# Patient Record
Sex: MALE | Race: WHITE | NOT HISPANIC OR LATINO | Employment: FULL TIME | URBAN - METROPOLITAN AREA
[De-identification: names, ages, dates, MRNs, and addresses within clinical notes are randomized per-mention and may not be internally consistent; named-entity substitution may affect disease eponyms.]

---

## 2021-05-26 ENCOUNTER — VIRTUAL VISIT (OUTPATIENT)
Dept: FAMILY MEDICINE | Facility: CLINIC | Age: 41
End: 2021-05-26
Payer: COMMERCIAL

## 2021-05-26 DIAGNOSIS — K21.9 GASTROESOPHAGEAL REFLUX DISEASE, UNSPECIFIED WHETHER ESOPHAGITIS PRESENT: Primary | ICD-10-CM

## 2021-05-26 PROCEDURE — 99203 OFFICE O/P NEW LOW 30 MIN: CPT | Mod: 95 | Performed by: FAMILY MEDICINE

## 2021-05-26 RX ORDER — PANTOPRAZOLE SODIUM 40 MG/1
40 TABLET, DELAYED RELEASE ORAL DAILY
Qty: 90 TABLET | Refills: 3 | Status: SHIPPED | OUTPATIENT
Start: 2021-05-26 | End: 2022-05-16

## 2021-05-26 NOTE — PROGRESS NOTES
Reyes is a 41 year old who is being evaluated via a billable telephone visit.      What phone number would you like to be contacted at? mobile  How would you like to obtain your AVS? Bennett Luz   Reyes is a 41 year old who presents for the following health issues:    HPI     Patient has h/o GERD for over 10 years. Patient takes omeprazole on and off which helps. Patient has epigastric discomfort, hoarseness, acid taste in mouth especially when laying down. Patient does drink a lot of tea but trying to limit this.    Review of Systems   Constitutional, HEENT, cardiovascular, pulmonary, GI, , musculoskeletal, neuro, skin, endocrine and psych systems are negative, except as otherwise noted.      Objective           Vitals:  No vitals were obtained today due to virtual visit.    Physical Exam   healthy, alert and no distress  PSYCH: Alert and oriented times 3; coherent speech, normal   rate and volume, able to articulate logical thoughts, able   to abstract reason, no tangential thoughts, no hallucinations   or delusions  His affect is normal  RESP: No cough, no audible wheezing, able to talk in full sentences  Remainder of exam unable to be completed due to telephone visits    A/P:  (K21.9) Gastroesophageal reflux disease, unspecified whether esophagitis present  (primary encounter diagnosis)  Comment:   Plan: GASTROENTEROLOGY ADULT REF CONSULT ONLY,         Helicobacter pylori Antigen Stool, pantoprazole        (PROTONIX) 40 MG EC tablet        Treat with ppi daily. R/o h pylori. If no improvements, patient will see GI for consultation.    Albaro Nash MD          Phone call duration: 11 minutes

## 2021-05-27 DIAGNOSIS — K21.9 GASTROESOPHAGEAL REFLUX DISEASE, UNSPECIFIED WHETHER ESOPHAGITIS PRESENT: ICD-10-CM

## 2021-05-27 PROCEDURE — 87338 HPYLORI STOOL AG IA: CPT | Performed by: FAMILY MEDICINE

## 2021-05-28 LAB — H PYLORI AG STL QL IA: NEGATIVE

## 2021-06-19 ENCOUNTER — HEALTH MAINTENANCE LETTER (OUTPATIENT)
Age: 41
End: 2021-06-19

## 2021-08-05 ENCOUNTER — VIRTUAL VISIT (OUTPATIENT)
Dept: GASTROENTEROLOGY | Facility: CLINIC | Age: 41
End: 2021-08-05
Attending: FAMILY MEDICINE
Payer: COMMERCIAL

## 2021-08-05 DIAGNOSIS — K21.9 GASTROESOPHAGEAL REFLUX DISEASE, UNSPECIFIED WHETHER ESOPHAGITIS PRESENT: ICD-10-CM

## 2021-08-05 PROCEDURE — 99203 OFFICE O/P NEW LOW 30 MIN: CPT | Mod: 95 | Performed by: PHYSICIAN ASSISTANT

## 2021-08-05 RX ORDER — FAMOTIDINE 20 MG/1
20 TABLET, FILM COATED ORAL AT BEDTIME
Qty: 30 TABLET | Refills: 2 | Status: SHIPPED | OUTPATIENT
Start: 2021-08-05 | End: 2022-02-24

## 2021-08-05 NOTE — PROGRESS NOTES
Reyes is a 41 year old who is being evaluated via a billable video visit.      How would you like to obtain your AVS? MyChart  If the video visit is dropped, the invitation should be resent by: Send to e-mail at: [email or web address]  Will anyone else be joining your video visit? No    Video Start Time:   Video-Visit Details    Type of service:  Video Visit    Video End Time:    Originating Location (pt. Location):     Distant Location (provider location):  [facility]     Platform used for Video Visit:     Joey Garcia CMA

## 2021-08-05 NOTE — PATIENT INSTRUCTIONS
It was a pleasure visiting with you today.     Continue protonix every morning. This is best taken 30 minutes before breakfast.     Start taking pepcid (Famotidine) 20mg at bedtime.     Please schedule your upper endoscopy. If you have not heard from the scheduling office within 2 business days, please call [phone] to schedule.     Let's plan to follow up 2-3 weeks after your work up.     Mundo Chavez PA-C  Gastroenterology  [facility]

## 2021-08-05 NOTE — PROGRESS NOTES
GASTROENTEROLOGY NEW PATIENT VIDEO VISIT     Video Start Time: 9:01 AM    CC/REFERRING MD:    No Ref-Primary, Physician  Albaro Nash    REASON FOR CONSULTATION:   Referred by Albaro Nash for New Patient (Gastroesophageal reflux disease)        HISTORY OF PRESENT ILLNESS:    Reyes Angel is 41 year old male who presents for evaluation of reflux. He has had intermittent reflux symptoms for the past 10-15 years. He recalls being diagnosed with possible ulcer in his early 20's and being on PPI medication for 3-6 months course which did resolve his symptoms at the time. Since then he has taken tums and prilosec occ for symptoms. He would occ feel gassy with prilosec. More recently he saw his primary care for his reflux and was given protonix 40mg. He does not feel any side effects with protonix and finds that it is working better than otc prilosec, however he still has breakthrough symptoms often. He is avoiding alcohol and carbonated beverages. He is still occ taking tums. He is avoiding eating late before bedtime and sleeping on his left side.     He still has symptoms in the morning of acid reflux and also describes sore throat.     He has never had an upper endoscopy before.     ALLERGIES:  Patient has no known allergies.      PERTINENT MEDICATIONS:    Current Outpatient Medications:      pantoprazole (PROTONIX) 40 MG EC tablet, Take 1 tablet (40 mg) by mouth daily Take 30 minutes before eating., Disp: 90 tablet, Rfl: 3      PHYSICAL EXAMINATION:  Constitutional: aaox3, cooperative, pleasant, not dyspneic/diaphoretic, no acute distress      GENERAL: Healthy, alert and no distress  EYES: Eyes grossly normal to inspection.  No discharge or erythema, or obvious scleral/conjunctival abnormalities.  RESP: No audible wheeze, cough, or visible cyanosis.  No visible retractions or increased work of breathing.    SKIN: Visible skin clear. No significant rash, abnormal pigmentation or lesions.  NEURO: Cranial nerves  grossly intact.  Mentation and speech appropriate for age.  PSYCH: Mentation appears normal, affect normal/bright, judgement and insight intact, normal speech and appearance well-groomed.        ASSESSMENT/PLAN:    1. Gastroesophageal reflux disease, unspecified whether esophagitis present  - Adult Gastro Ref - Procedure Only; Future  - famotidine (PEPCID) 20 MG tablet; Take 1 tablet (20 mg) by mouth At Bedtime  Dispense: 30 tablet; Refill: 2      Reyes Angel is a 41 year old male who presents for evaluation of GERD. He has hx of GERD for at least 10-15 years. He has treated in the past with [facility] otc Prilosec. More recently he has started protonix 40mg but despite this he continues to have symptoms. Recommended we add in pepcid in the evenings and check an EGD given his continued symptoms and long hx of GERD. He verbalizes understanding and agrees with this plan.     We will plan to follow up 2-3 weeks after his EGD to review results.     Thank you for this consultation.  It was a pleasure to participate in the care of this patient; please contact us with any further questions.      This note was created with voice recognition software, and while reviewed for accuracy, typos may remain.         30 minutes spent on the date of the encounter doing chart review, history and exam, documentation and further activities per the note    Mundo Chavez PA-C  Gastroenterology  [facility]       Video-Visit Details    Type of service:  Video Visit    Video End Time: 9:26 AM    Originating Location (pt. Location): Home    Distant Location (provider location):  [facility]     Platform used for Video Visit: [employer]

## 2021-08-06 DIAGNOSIS — Z11.59 ENCOUNTER FOR SCREENING FOR OTHER VIRAL DISEASES: ICD-10-CM

## 2021-08-20 ENCOUNTER — TELEPHONE (OUTPATIENT)
Dept: GASTROENTEROLOGY | Facility: CLINIC | Age: 41
End: 2021-08-20

## 2021-08-20 NOTE — TELEPHONE ENCOUNTER
Writer reviewed pre-assessment questions with patient prior to upcoming EGD on 8.31.2021.      Covid test scheduled: 8.27.2021    Arrival time: 0700    Facility location: [facility]    Sedation type: CS    Implantable devices? No    Blood thinners/Antiplatelet medication? No    Reviewed EGD prep instructions with patient.      Designated  policy reviewed with patient.     Patient verbalized understanding.  No further questions or concerns.    Inocencia Brink RN

## 2021-08-27 ENCOUNTER — LAB (OUTPATIENT)
Dept: LAB | Facility: CLINIC | Age: 41
End: 2021-08-27
Payer: COMMERCIAL

## 2021-08-27 DIAGNOSIS — Z11.59 ENCOUNTER FOR SCREENING FOR OTHER VIRAL DISEASES: ICD-10-CM

## 2021-08-27 PROCEDURE — U0005 INFEC AGEN DETEC AMPLI PROBE: HCPCS

## 2021-08-27 PROCEDURE — U0003 INFECTIOUS AGENT DETECTION BY NUCLEIC ACID (DNA OR RNA); SEVERE ACUTE RESPIRATORY SYNDROME CORONAVIRUS 2 (SARS-COV-2) (CORONAVIRUS DISEASE [COVID-19]), AMPLIFIED PROBE TECHNIQUE, MAKING USE OF HIGH THROUGHPUT TECHNOLOGIES AS DESCRIBED BY CMS-2020-01-R: HCPCS

## 2021-08-28 LAB — SARS-COV-2 RNA RESP QL NAA+PROBE: NEGATIVE

## 2021-08-31 ENCOUNTER — HOSPITAL ENCOUNTER (OUTPATIENT)
Facility: AMBULATORY SURGERY CENTER | Age: 41
Discharge: HOME | End: 2021-08-31
Attending: INTERNAL MEDICINE | Admitting: INTERNAL MEDICINE
Payer: COMMERCIAL

## 2021-08-31 VITALS
SYSTOLIC BLOOD PRESSURE: 115 MMHG | HEIGHT: 75 IN | OXYGEN SATURATION: 97 % | TEMPERATURE: 97.9 F | WEIGHT: 225 LBS | HEART RATE: 66 BPM | RESPIRATION RATE: 16 BRPM | BODY MASS INDEX: 27.98 KG/M2 | DIASTOLIC BLOOD PRESSURE: 77 MMHG

## 2021-08-31 LAB — UPPER GI ENDOSCOPY: NORMAL

## 2021-08-31 PROCEDURE — 43235 EGD DIAGNOSTIC BRUSH WASH: CPT

## 2021-08-31 RX ORDER — SODIUM CHLORIDE, SODIUM LACTATE, POTASSIUM CHLORIDE, CALCIUM CHLORIDE 600; 310; 30; 20 MG/100ML; MG/100ML; MG/100ML; MG/100ML
INJECTION, SOLUTION INTRAVENOUS CONTINUOUS
Status: DISCONTINUED | OUTPATIENT
Start: 2021-08-31 | End: 2021-09-01 | Stop reason: HOSPADM

## 2021-08-31 RX ORDER — SIMETHICONE
LIQUID (ML) MISCELLANEOUS PRN
Status: DISCONTINUED | OUTPATIENT
Start: 2021-08-31 | End: 2021-08-31 | Stop reason: HOSPADM

## 2021-08-31 RX ORDER — LIDOCAINE 40 MG/G
CREAM TOPICAL
Status: DISCONTINUED | OUTPATIENT
Start: 2021-08-31 | End: 2021-09-01 | Stop reason: HOSPADM

## 2021-08-31 RX ORDER — ONDANSETRON 2 MG/ML
4 INJECTION INTRAMUSCULAR; INTRAVENOUS
Status: DISCONTINUED | OUTPATIENT
Start: 2021-08-31 | End: 2021-09-01 | Stop reason: HOSPADM

## 2021-08-31 RX ORDER — FENTANYL CITRATE 50 UG/ML
INJECTION, SOLUTION INTRAMUSCULAR; INTRAVENOUS PRN
Status: DISCONTINUED | OUTPATIENT
Start: 2021-08-31 | End: 2021-08-31 | Stop reason: HOSPADM

## 2021-08-31 ASSESSMENT — MIFFLIN-ST. JEOR: SCORE: 2021.22

## 2021-10-04 ENCOUNTER — HEALTH MAINTENANCE LETTER (OUTPATIENT)
Age: 41
End: 2021-10-04

## 2021-11-04 ENCOUNTER — VIRTUAL VISIT (OUTPATIENT)
Dept: GASTROENTEROLOGY | Facility: CLINIC | Age: 41
End: 2021-11-04
Payer: COMMERCIAL

## 2021-11-04 DIAGNOSIS — K21.9 GASTROESOPHAGEAL REFLUX DISEASE, UNSPECIFIED WHETHER ESOPHAGITIS PRESENT: Primary | ICD-10-CM

## 2021-11-04 DIAGNOSIS — K44.9 HIATAL HERNIA: ICD-10-CM

## 2021-11-04 PROCEDURE — 99215 OFFICE O/P EST HI 40 MIN: CPT | Mod: 95 | Performed by: PHYSICIAN ASSISTANT

## 2021-11-04 NOTE — PATIENT INSTRUCTIONS
It was a pleasure visiting with you today.     Please make a lab appointment for blood work and to collect stool kits. You can contact your local [facility] or call [phone] to schedule at any convenient [facility] location. We will either call you or send you a My Chart message with your results.     Continue protonix 40mg every morning. Continue pepcid as needed.     Follow up in 3 months. Please call [phone] to schedule your follow up.       Mundo Chavez PA-C  Gastroenterology  [facility]

## 2021-11-04 NOTE — PROGRESS NOTES
GASTROENTEROLOGY FOLLOW UP VIDEO VISIT     Video Start Time: 8:31 AM    CC/REFERRING MD:    No Ref-Primary, Physician      REASON FOR VISIT: RECHECK (Follow up from procedure)      HISTORY OF PRESENT ILLNESS:    Reyes Angel is 41 year old male who presents for follow up.     He was initially evaluated in August 2021 for concerns with reflux.  See initial note for more detail.  Briefly he was experiencing 10 to 15 years of intermittent reflux.  He also recalls being diagnosed with a possible ulcer in his early 20s and was treated with PPI medication for several months which did resolve his symptoms.  Over the past 10 to 15 years he has taken Tums and Prilosec as needed for symptoms.  In the past several months he has been on Protonix 40 mg daily for more persistent reflux symptoms.  He does note improvement on Protonix daily.  He occasionally takes Pepcid 20 mg as needed for breakthrough symptoms.  He overall feels that this regimen works well for him.  She is also monitor his diet and behavior.  He tries to avoid eating late and sleeps with head of bed elevated which helps.  He is still taking Tums on occasion.    We did update an upper endoscopy in August 2021.  He was noted to have a 3 cm hiatal hernia otherwise upper endoscopy was unrevealing.        Reyes  has a past medical history of Gastroesophageal reflux disease.    He  has a past surgical history that includes Esophagoscopy, gastroscopy, duodenoscopy (EGD), combined (N/A, 8/31/2021).    He   reports that he has never smoked. He has never used smokeless tobacco. He reports current alcohol use. He reports that he does not use drugs.      ALLERGIES:  Patient has no known allergies.      PREVIOUS ENDOSCOPY:    UPPER ENDOSCOPY 8/31/2021  Findings:        The examined esophagus was normal.        Esophagogastric landmarks were identified: the Z-line was found at 44        cm, the gastroesophageal junction was found at 47 cm and the site of        hiatal  narrowing was found at 45 cm from the incisors.        The gastroesophageal flap valve was visualized endoscopically and        classified as Hill Grade IV (no fold, wide open lumen, hiatal hernia present).        A 3 cm hiatal hernia was present.        The exam of the stomach was otherwise normal.        The examined duodenum was normal.        The cardia and gastric fundus were normal on retroflexion.     PERTINENT MEDICATIONS:    Current Outpatient Medications:      famotidine (PEPCID) 20 MG tablet, Take 1 tablet (20 mg) by mouth At Bedtime (Patient taking differently: Take 20 mg by mouth nightly as needed ), Disp: 30 tablet, Rfl: 2     pantoprazole (PROTONIX) 40 MG EC tablet, Take 1 tablet (40 mg) by mouth daily Take 30 minutes before eating., Disp: 90 tablet, Rfl: 3      PHYSICAL EXAMINATION:  Constitutional: aaox3, cooperative, pleasant, not dyspneic/diaphoretic, no acute distress      GENERAL: Healthy, alert and no distress  EYES: Eyes grossly normal to inspection.  No discharge or erythema, or obvious scleral/conjunctival abnormalities.  RESP: No audible wheeze, cough, or visible cyanosis.  No visible retractions or increased work of breathing.    SKIN: Visible skin clear. No significant rash, abnormal pigmentation or lesions.  NEURO: Cranial nerves grossly intact.  Mentation and speech appropriate for age.  PSYCH: Mentation appears normal, affect normal/bright, judgement and insight intact, normal speech and appearance well-groomed.        ASSESSMENT/PLAN:    1. Gastroesophageal reflux disease, unspecified whether esophagitis present  - Comprehensive metabolic panel; Future  - CBC with Platelets & Differential; Future  - TSH with free T4 reflex; Future  2. Hiatal hernia          Reyes Angel is a 41 year old male who presents for follow-up GERD.        Overall doing well at this time with his regimen of protonix daily and pepcid as needed. Recent EGD noted hiatal hernia but was otherwise unremarkable.  We reviewed these findings today. We discussed management of both GERD and hiatal hernia.  Reviewed benefits vs risks of PPI medication. Will have him continue protonix daily and pepcid as needed at this time. Eventually can try to decrease dose of PPI to lowest effective dose or switch to pepcid altogether. Also reviewed  option for hiatal hernia repair if wanting to avoid long term PPI/medications.     We will check some basic labs as I do not see any labs in the system. Again, will have him continue with current reflux regimen. Follow up in 3 months to check progress and consider decreasing medications at that time.     All questions and concerns addressed.       Return in about 3 months (around 2/4/2022) for Follow up, using a video visit.      Thank you for this consultation.  It was a pleasure to participate in the care of this patient; please contact us with any further questions.        This note was created with voice recognition software, and while reviewed for accuracy, typos may remain.       40 minutes spent on the date of the encounter doing chart review, history and exam, documentation and further activities per the note    Mundo Chavez PA-C  Gastroenterology  [facility]       Video-Visit Details    Type of service:  Video Visit    Video End Time:8:57 AM    Originating Location (pt. Location): Home    Distant Location (provider location):  [facility]     Platform used for Video Visit: [employer]

## 2021-11-04 NOTE — PROGRESS NOTES
Reyes is a 41 year old who is being evaluated via a billable video visit.      How would you like to obtain your AVS? MyChart  If the video visit is dropped, the invitation should be resent by: Text to cell phone: [phone]  Will anyone else be joining your video visit? No      Dayana Chavira LPN on 11/4/21 at 8:14 AM

## 2021-11-29 ENCOUNTER — LAB (OUTPATIENT)
Dept: LAB | Facility: CLINIC | Age: 41
End: 2021-11-29
Payer: COMMERCIAL

## 2021-11-29 DIAGNOSIS — K21.9 GASTROESOPHAGEAL REFLUX DISEASE, UNSPECIFIED WHETHER ESOPHAGITIS PRESENT: ICD-10-CM

## 2021-11-29 LAB
BASOPHILS # BLD AUTO: 0 10E3/UL (ref 0–0.2)
BASOPHILS NFR BLD AUTO: 1 %
EOSINOPHIL # BLD AUTO: 0.2 10E3/UL (ref 0–0.7)
EOSINOPHIL NFR BLD AUTO: 3 %
ERYTHROCYTE [DISTWIDTH] IN BLOOD BY AUTOMATED COUNT: 12.3 % (ref 10–15)
HCT VFR BLD AUTO: 45 % (ref 40–53)
HGB BLD-MCNC: 15 G/DL (ref 13.3–17.7)
LYMPHOCYTES # BLD AUTO: 1.8 10E3/UL (ref 0.8–5.3)
LYMPHOCYTES NFR BLD AUTO: 31 %
MCH RBC QN AUTO: 29.6 PG (ref 26.5–33)
MCHC RBC AUTO-ENTMCNC: 33.3 G/DL (ref 31.5–36.5)
MCV RBC AUTO: 89 FL (ref 78–100)
MONOCYTES # BLD AUTO: 0.8 10E3/UL (ref 0–1.3)
MONOCYTES NFR BLD AUTO: 14 %
NEUTROPHILS # BLD AUTO: 2.9 10E3/UL (ref 1.6–8.3)
NEUTROPHILS NFR BLD AUTO: 51 %
PLATELET # BLD AUTO: 146 10E3/UL (ref 150–450)
RBC # BLD AUTO: 5.06 10E6/UL (ref 4.4–5.9)
WBC # BLD AUTO: 5.7 10E3/UL (ref 4–11)

## 2021-11-29 PROCEDURE — 36415 COLL VENOUS BLD VENIPUNCTURE: CPT

## 2021-11-29 PROCEDURE — 80050 GENERAL HEALTH PANEL: CPT

## 2021-12-01 DIAGNOSIS — K21.9 GASTROESOPHAGEAL REFLUX DISEASE, UNSPECIFIED WHETHER ESOPHAGITIS PRESENT: Primary | ICD-10-CM

## 2021-12-01 LAB
ALBUMIN SERPL-MCNC: 4.1 G/DL (ref 3.4–5)
ALP SERPL-CCNC: 63 U/L (ref 40–150)
ALT SERPL W P-5'-P-CCNC: 60 U/L (ref 0–70)
ANION GAP SERPL CALCULATED.3IONS-SCNC: 15 MMOL/L (ref 3–14)
AST SERPL W P-5'-P-CCNC: 46 U/L (ref 0–45)
BILIRUB SERPL-MCNC: 0.7 MG/DL (ref 0.2–1.3)
BUN SERPL-MCNC: 14 MG/DL (ref 7–30)
CALCIUM SERPL-MCNC: 9.5 MG/DL (ref 8.5–10.1)
CHLORIDE BLD-SCNC: 110 MMOL/L (ref 94–109)
CO2 SERPL-SCNC: 20 MMOL/L (ref 20–32)
CREAT SERPL-MCNC: 1.11 MG/DL (ref 0.66–1.25)
GFR SERPL CREATININE-BSD FRML MDRD: 83 ML/MIN/1.73M2
GLUCOSE BLD-MCNC: 74 MG/DL (ref 70–99)
POTASSIUM BLD-SCNC: 4.6 MMOL/L (ref 3.4–5.3)
PROT SERPL-MCNC: 7.8 G/DL (ref 6.8–8.8)
SODIUM SERPL-SCNC: 145 MMOL/L (ref 133–144)
TSH SERPL DL<=0.005 MIU/L-ACNC: 0.91 MU/L (ref 0.4–4)

## 2021-12-16 ENCOUNTER — IMMUNIZATION (OUTPATIENT)
Dept: NURSING | Facility: CLINIC | Age: 41
End: 2021-12-16
Payer: COMMERCIAL

## 2021-12-16 PROCEDURE — 90682 RIV4 VACC RECOMBINANT DNA IM: CPT

## 2021-12-16 PROCEDURE — 0004A PR COVID VAC PFIZER DIL RECON 30 MCG/0.3 ML IM: CPT

## 2021-12-16 PROCEDURE — 90471 IMMUNIZATION ADMIN: CPT

## 2021-12-16 PROCEDURE — 91300 PR COVID VAC PFIZER DIL RECON 30 MCG/0.3 ML IM: CPT

## 2022-02-23 ENCOUNTER — LAB (OUTPATIENT)
Dept: LAB | Facility: CLINIC | Age: 42
End: 2022-02-23
Payer: COMMERCIAL

## 2022-02-23 DIAGNOSIS — K21.9 GASTROESOPHAGEAL REFLUX DISEASE, UNSPECIFIED WHETHER ESOPHAGITIS PRESENT: ICD-10-CM

## 2022-02-23 LAB
ALBUMIN SERPL-MCNC: 4 G/DL (ref 3.4–5)
ALP SERPL-CCNC: 65 U/L (ref 40–150)
ALT SERPL W P-5'-P-CCNC: 33 U/L (ref 0–70)
ANION GAP SERPL CALCULATED.3IONS-SCNC: 6 MMOL/L (ref 3–14)
AST SERPL W P-5'-P-CCNC: 16 U/L (ref 0–45)
BASOPHILS # BLD AUTO: 0 10E3/UL (ref 0–0.2)
BASOPHILS NFR BLD AUTO: 1 %
BILIRUB SERPL-MCNC: 0.8 MG/DL (ref 0.2–1.3)
BUN SERPL-MCNC: 16 MG/DL (ref 7–30)
CALCIUM SERPL-MCNC: 9.4 MG/DL (ref 8.5–10.1)
CHLORIDE BLD-SCNC: 107 MMOL/L (ref 94–109)
CO2 SERPL-SCNC: 27 MMOL/L (ref 20–32)
CREAT SERPL-MCNC: 1.17 MG/DL (ref 0.66–1.25)
EOSINOPHIL # BLD AUTO: 0.2 10E3/UL (ref 0–0.7)
EOSINOPHIL NFR BLD AUTO: 3 %
ERYTHROCYTE [DISTWIDTH] IN BLOOD BY AUTOMATED COUNT: 12.9 % (ref 10–15)
GFR SERPL CREATININE-BSD FRML MDRD: 81 ML/MIN/1.73M2
GLUCOSE BLD-MCNC: 100 MG/DL (ref 70–99)
HCT VFR BLD AUTO: 45 % (ref 40–53)
HGB BLD-MCNC: 15.2 G/DL (ref 13.3–17.7)
LYMPHOCYTES # BLD AUTO: 1.7 10E3/UL (ref 0.8–5.3)
LYMPHOCYTES NFR BLD AUTO: 32 %
MCH RBC QN AUTO: 30 PG (ref 26.5–33)
MCHC RBC AUTO-ENTMCNC: 33.8 G/DL (ref 31.5–36.5)
MCV RBC AUTO: 89 FL (ref 78–100)
MONOCYTES # BLD AUTO: 0.7 10E3/UL (ref 0–1.3)
MONOCYTES NFR BLD AUTO: 13 %
NEUTROPHILS # BLD AUTO: 2.7 10E3/UL (ref 1.6–8.3)
NEUTROPHILS NFR BLD AUTO: 51 %
PLATELET # BLD AUTO: 160 10E3/UL (ref 150–450)
POTASSIUM BLD-SCNC: 4.3 MMOL/L (ref 3.4–5.3)
PROT SERPL-MCNC: 7.6 G/DL (ref 6.8–8.8)
RBC # BLD AUTO: 5.06 10E6/UL (ref 4.4–5.9)
SODIUM SERPL-SCNC: 140 MMOL/L (ref 133–144)
WBC # BLD AUTO: 5.2 10E3/UL (ref 4–11)

## 2022-02-23 PROCEDURE — 85025 COMPLETE CBC W/AUTO DIFF WBC: CPT

## 2022-02-23 PROCEDURE — 80053 COMPREHEN METABOLIC PANEL: CPT

## 2022-02-23 PROCEDURE — 36415 COLL VENOUS BLD VENIPUNCTURE: CPT

## 2022-02-24 RX ORDER — FAMOTIDINE 20 MG/1
20 TABLET, FILM COATED ORAL AT BEDTIME
Qty: 30 TABLET | Refills: 2 | OUTPATIENT
Start: 2022-02-24

## 2022-02-24 RX ORDER — FAMOTIDINE 20 MG/1
20 TABLET, FILM COATED ORAL
Qty: 30 TABLET | Refills: 3 | Status: SHIPPED | OUTPATIENT
Start: 2022-02-24 | End: 2022-06-23

## 2022-02-24 NOTE — TELEPHONE ENCOUNTER
Duplicate ordered today  famotidine (PEPCID) 20 MG tablet 30 tablet 3 2/24/2022  No   Sig - Route: Take 1 tablet (20 mg) by mouth nightly as needed (heartburn) - Oral   Sent to pharmacy as: Famotidine 20 MG Oral Tablet (PEPCID)   Class: E-Prescribe   Order: [id number]   E-Prescribing Status: Receipt confirmed by pharmacy (2/24/2022  9:36 AM CST)       Pharmacy    [id number] - [place], [place] - [address]     Associated Diagnoses    Gastroesophageal reflux disease, unspecified whether esophagitis present [K21.9]

## 2022-06-23 ENCOUNTER — VIRTUAL VISIT (OUTPATIENT)
Dept: GASTROENTEROLOGY | Facility: CLINIC | Age: 42
End: 2022-06-23
Payer: COMMERCIAL

## 2022-06-23 DIAGNOSIS — K44.9 HIATAL HERNIA: ICD-10-CM

## 2022-06-23 DIAGNOSIS — K21.9 GASTROESOPHAGEAL REFLUX DISEASE, UNSPECIFIED WHETHER ESOPHAGITIS PRESENT: Primary | ICD-10-CM

## 2022-06-23 PROCEDURE — 99214 OFFICE O/P EST MOD 30 MIN: CPT | Mod: 95 | Performed by: PHYSICIAN ASSISTANT

## 2022-06-23 RX ORDER — FAMOTIDINE 20 MG/1
20 TABLET, FILM COATED ORAL 2 TIMES DAILY
Qty: 60 TABLET | Refills: 5 | Status: SHIPPED | OUTPATIENT
Start: 2022-06-23 | End: 2022-08-03

## 2022-06-23 NOTE — PATIENT INSTRUCTIONS
It was a pleasure visiting with you today.     Let's hold protonix and instead take pepcid 20mg twice daily.     Please return to protonix once daily if any symptoms return.     Return in about 1 year (around 6/23/2023) for Follow up, using a video visit., sooner if needed.       Mundo Chavez PA-C  Gastroenterology  [facility]

## 2022-06-23 NOTE — PROGRESS NOTES
Reyes is a 42 year old who is being evaluated via a billable video visit.      How would you like to obtain your AVS? MyChart  If the video visit is dropped, the invitation should be resent by: Send to e-mail at: [email or web address]  Will anyone else be joining your video visit? No          GASTROENTEROLOGY FOLLOW UP VIDEO VISIT     Video Start Time: 8:32 AM    CC/REFERRING MD:    No Ref-Primary, Physician    REASON FOR VISIT: Follow Up      HISTORY OF PRESENT ILLNESS:    Reyes Angel is 42 year old male who presents for follow up. He was initially evaluated in August 2021 for concerns with reflux.  See initial note for more detail.  Briefly he was experiencing 10 to 15 years of intermittent reflux.  He also recalls being diagnosed with a possible ulcer in his early 20s and was treated with PPI medication for several months which resolved his symptoms at that time. Over the past 10 to 15 years he has taken Tums and Prilosec as needed for symptoms.  In the past year he has been on Protonix 40 mg daily for more persistent reflux symptoms. We did update an upper endoscopy in August 2021.  He was noted to have a 3 cm hiatal hernia otherwise upper endoscopy was unrevealing. He continues to do well with Protonix daily. He will occasionally take a Pepcid but not very often. He does not have any alarming symptoms such as vomiting, dysphagia or melena.        Reyes  has a past medical history of Gastroesophageal reflux disease.    He  has a past surgical history that includes Esophagoscopy, gastroscopy, duodenoscopy (EGD), combined (N/A, 8/31/2021).    He  reports that he has never smoked. He has never used smokeless tobacco. He reports current alcohol use. He reports that he does not use drugs.    His family history is not on file.    ALLERGIES:  Patient has no known allergies.      PERTINENT MEDICATIONS:    Current Outpatient Medications:      famotidine (PEPCID) 20 MG tablet, Take 1 tablet (20 mg) by mouth nightly  as needed (heartburn), Disp: 30 tablet, Rfl: 3     pantoprazole (PROTONIX) 40 MG EC tablet, Take 1 tablet (40 mg) by mouth daily Take 30 minutes before eating., Disp: 90 tablet, Rfl: 0      PHYSICAL EXAMINATION:  Constitutional: aaox3, cooperative, pleasant, not dyspneic/diaphoretic, no acute distress      GENERAL: Healthy, alert and no distress  EYES: Eyes grossly normal to inspection.  No discharge or erythema, or obvious scleral/conjunctival abnormalities.  RESP: No audible wheeze, cough, or visible cyanosis.  No visible retractions or increased work of breathing.    SKIN: Visible skin clear. No significant rash, abnormal pigmentation or lesions.  NEURO: Cranial nerves grossly intact.  Mentation and speech appropriate for age.  PSYCH: Mentation appears normal, affect normal/bright, judgement and insight intact, normal speech and appearance well-groomed.          PERTINENT STUDIES: (I personally reviewed these laboratory studies today)  Most recent CBC:   Recent Labs   Lab Test 02/23/22  0816 11/29/21  0757   WBC 5.2 5.7   HGB 15.2 15.0   HCT 45.0 45.0    146*     Most recent hepatic panel:  Recent Labs   Lab Test 02/23/22  0816 11/29/21  0757   ALT 33 60   AST 16 46*     Most recent creatinine:  Recent Labs   Lab Test 02/23/22  0816 11/29/21  0757   CR 1.17 1.11       TSH   Date Value Ref Range Status   11/29/2021 0.91 0.40 - 4.00 mU/L Final         ASSESSMENT/PLAN:    1. Gastroesophageal reflux disease, unspecified whether esophagitis present  - famotidine (PEPCID) 20 MG tablet; Take 1 tablet (20 mg) by mouth 2 times daily  Dispense: 60 tablet; Refill: 5    2. Hiatal hernia        Reyes Anegl is a 42 year old male who presents for follow up. He os overall doing well with protonix 40mg daily. He occ will additionally take pepcid as needed. In the past we have discussed decreasing PPI to lowest effective dose and/or switching to pepcid daily due to long term risks associated with PPI. Will trail at  this time. Recommended holding protonix and taking pepcid 20mg twice daily. If GERD not well controlled however should return to protonix daily.     He agrees with plan.     Return in about 1 year (around 6/23/2023) for Follow up, using a video visit., sooner if needed       Thank you for this consultation.  It was a pleasure to participate in the care of this patient; please contact us with any further questions.        This note was created with voice recognition software, and while reviewed for accuracy, typos may remain.       35 minutes spent on the date of the encounter doing chart review, history and exam, documentation and further activities per the note    Mundo Chavez PA-C  Gastroenterology  [facility]       Video-Visit Details    Type of service:  Video Visit    Video End Time:8:59 AM    Originating Location (pt. Location): Home    Distant Location (provider location):  [facility]     Platform used for Video Visit: [employer]

## 2022-07-10 ENCOUNTER — HEALTH MAINTENANCE LETTER (OUTPATIENT)
Age: 42
End: 2022-07-10

## 2022-08-30 DIAGNOSIS — K21.9 GASTROESOPHAGEAL REFLUX DISEASE, UNSPECIFIED WHETHER ESOPHAGITIS PRESENT: ICD-10-CM

## 2022-08-30 RX ORDER — PANTOPRAZOLE SODIUM 20 MG/1
TABLET, DELAYED RELEASE ORAL
Qty: 90 TABLET | Refills: 2 | Status: SHIPPED | OUTPATIENT
Start: 2022-08-30 | End: 2023-05-18

## 2022-08-30 NOTE — TELEPHONE ENCOUNTER
"Requested Prescriptions   Pending Prescriptions Disp Refills     pantoprazole (PROTONIX) 20 MG EC tablet [Pharmacy Med Name: PANTOPRAZOLE SOD DR 20 MG TAB] 30 tablet 0     Sig: TAKE 1 TABLET BY MOUTH EVERY DAY       PPI Protocol Passed - 8/30/2022  4:24 PM        Passed - Not on Clopidogrel (unless Pantoprazole ordered)        Passed - No diagnosis of osteoporosis on record        Passed - Recent (12 mo) or future (30 days) visit within the authorizing provider's specialty     Patient has had an office visit with the authorizing provider or a provider within the authorizing providers department within the previous 12 mos or has a future within next 30 days. See \"Patient Info\" tab in inbasket, or \"Choose Columns\" in Meds & Orders section of the refill encounter.              Passed - Medication is active on med list        Passed - Patient is age 18 or older           Refill authorized per [facility] protocol.    Anastacia Mckenna RN    "

## 2022-09-11 ENCOUNTER — HEALTH MAINTENANCE LETTER (OUTPATIENT)
Age: 42
End: 2022-09-11

## 2023-05-18 ENCOUNTER — VIRTUAL VISIT (OUTPATIENT)
Dept: GASTROENTEROLOGY | Facility: CLINIC | Age: 43
End: 2023-05-18
Payer: COMMERCIAL

## 2023-05-18 DIAGNOSIS — K44.9 HIATAL HERNIA: ICD-10-CM

## 2023-05-18 DIAGNOSIS — K21.9 GASTROESOPHAGEAL REFLUX DISEASE, UNSPECIFIED WHETHER ESOPHAGITIS PRESENT: Primary | ICD-10-CM

## 2023-05-18 PROCEDURE — 99215 OFFICE O/P EST HI 40 MIN: CPT | Mod: VID | Performed by: PHYSICIAN ASSISTANT

## 2023-05-18 RX ORDER — PANTOPRAZOLE SODIUM 40 MG/1
40 TABLET, DELAYED RELEASE ORAL DAILY
Qty: 90 TABLET | Refills: 3 | Status: SHIPPED | OUTPATIENT
Start: 2023-05-18 | End: 2024-05-20

## 2023-05-18 NOTE — NURSING NOTE
Is the patient currently in the state of [place]? YES    Visit mode:VIDEO    If the visit is dropped, the patient can be reconnected by: VIDEO VISIT: Send to e-mail at: [email or web address]    Will anyone else be joining the visit? NO      How would you like to obtain your AVS? MyChart    Are changes needed to the allergy or medication list? NO    Reason for visit: Video Visit

## 2023-05-18 NOTE — PROGRESS NOTES
GI FOLLOW UP       REASON FOR CONSULTATION:   Referred by Referred Self for Video Visit        HPI: Reyes Angel is 43 year old male with longstanding history of GERD and hiatal hernia who presents for follow up.  Recently attempted to decrease PPI to lowest effective dose.  He tried Protonix at 20 mg daily every morning and was additionally taking Pepcid in the evenings.  While he does feel that his reflux is controlled during the day he does seem to wake up in the mornings with a sour taste in his mouth.  Symptoms did seem better controlled when he was on the 40 mg dose. He is without nausea, vomiting, dysphagia.    We did update an upper endoscopy in August 2021.  He was noted to have a 3 cm hiatal hernia otherwise upper endoscopy was unrevealing.    ALLERGIES:  Patient has no known allergies.      Current Outpatient Medications:      pantoprazole (PROTONIX) 20 MG EC tablet, TAKE 1 TABLET BY MOUTH EVERY DAY, Disp: 90 tablet, Rfl: 2    PHYSICAL EXAMINATION:  Constitutional: aaox3, cooperative, pleasant, not dyspneic/diaphoretic, no acute distress      GENERAL: Healthy, alert and no distress  EYES: Eyes grossly normal to inspection.  No discharge or erythema, or obvious scleral/conjunctival abnormalities.  RESP: No audible wheeze, cough, or visible cyanosis.  No visible retractions or increased work of breathing.    SKIN: Visible skin clear. No significant rash, abnormal pigmentation or lesions.  NEURO: Cranial nerves grossly intact.  Mentation and speech appropriate for age.  PSYCH: Mentation appears normal, affect normal/bright, judgement and insight intact, normal speech and appearance well-groomed.      ASSESSMENT/PLAN:    1. Gastroesophageal reflux disease, unspecified whether esophagitis present  - pantoprazole (PROTONIX) 40 MG EC tablet; Take 1 tablet (40 mg) by mouth daily Take 30 minutes before eating.  Dispense: 90 tablet; Refill: 3  - Adult GI Clinic Follow-Up Order (Blank); Future    2. Hiatal  hernia  - Adult GI Clinic Follow-Up Order (Blank); Future      Reyes Angel is a 43 year old male with longstanding history of reflux and hiatal hernia who presents for follow-up.  We have been trying to wean him to the lowest effective dose of PPI.  He is currently on Protonix 20 mg daily and additionally taking Pepcid in the evening.  While this does seem to help during the day he is waking up every morning with sour taste in his mouth.   We reviewed other measures that could be helpful including eating smaller meals, avoiding eating late or close to bedtime.  Trying bed blocks or wedge pillow.  He has tried a wedge pillow before and found it very uncomfortable.  He is interested to give bed blocks a try. We also discussed returning to Protonix 40 mg for better control of his GERD. We reviewed benefits vs risks of PPI. For the treatment of GERD, gastroenterologists generally agree that the well-established benefits of PPIs far outweigh their theoretical risks. Will plan to check basic labs and nutrient markers yearly.     He also inquired regarding surgical management of his acid reflux and hiatal hernia. While this is typically considered in those who fail medical therapy or with large hiatal hernias, advised that it can be an option to consider if he would prefer to avoid long term use of PPI. For now he plans to return to protonix 40mg but will let me know if at any point he is interested in a surgical consult.       Thank you for this consultation.  It was a pleasure to participate in the care of this patient; please contact us with any further questions.        This note was created with voice recognition software, and while reviewed for accuracy, typos may remain.       60 minutes spent by me on the date of the encounter doing chart review, history and exam, documentation and further activities per the note      Mundo Chavez PA-C  Division of Gastroenterology, Hepatology and Nutrition   [facility]  Clinics & Surgery [facility]            Video-Visit Details    Video Start Time: 9:28 AM    Type of service:  Video Visit    Video End Time:9:58 AM    Originating Location (pt. Location): Home    Distant Location (provider location):  Off-site    Platform used for Video Visit: Tessa

## 2023-07-29 ENCOUNTER — HEALTH MAINTENANCE LETTER (OUTPATIENT)
Age: 43
End: 2023-07-29

## 2023-10-24 ASSESSMENT — ENCOUNTER SYMPTOMS
SORE THROAT: 0
DIZZINESS: 0
COUGH: 0
EYE PAIN: 0
HEMATOCHEZIA: 0
ABDOMINAL PAIN: 0
DIARRHEA: 0
ARTHRALGIAS: 0
CHILLS: 0
HEMATURIA: 0
FEVER: 0
NAUSEA: 0
HEARTBURN: 0
HEADACHES: 0
NERVOUS/ANXIOUS: 0
PALPITATIONS: 0
JOINT SWELLING: 0
WEAKNESS: 0
SHORTNESS OF BREATH: 0
CONSTIPATION: 0
MYALGIAS: 0
DYSURIA: 0
PARESTHESIAS: 0
FREQUENCY: 0

## 2023-10-27 ENCOUNTER — OFFICE VISIT (OUTPATIENT)
Dept: FAMILY MEDICINE | Facility: CLINIC | Age: 43
End: 2023-10-27
Payer: COMMERCIAL

## 2023-10-27 VITALS
SYSTOLIC BLOOD PRESSURE: 113 MMHG | HEART RATE: 73 BPM | HEIGHT: 75 IN | DIASTOLIC BLOOD PRESSURE: 75 MMHG | RESPIRATION RATE: 8 BRPM | WEIGHT: 245.7 LBS | TEMPERATURE: 98.9 F | BODY MASS INDEX: 30.55 KG/M2 | OXYGEN SATURATION: 98 %

## 2023-10-27 DIAGNOSIS — Z13.220 SCREENING CHOLESTEROL LEVEL: ICD-10-CM

## 2023-10-27 DIAGNOSIS — Z00.00 ROUTINE GENERAL MEDICAL EXAMINATION AT A HEALTH CARE FACILITY: Primary | ICD-10-CM

## 2023-10-27 DIAGNOSIS — Z13.1 SCREENING FOR DIABETES MELLITUS: ICD-10-CM

## 2023-10-27 PROCEDURE — 90480 ADMN SARSCOV2 VAC 1/ONLY CMP: CPT | Performed by: FAMILY MEDICINE

## 2023-10-27 PROCEDURE — 91320 SARSCV2 VAC 30MCG TRS-SUC IM: CPT | Performed by: FAMILY MEDICINE

## 2023-10-27 PROCEDURE — 90471 IMMUNIZATION ADMIN: CPT | Performed by: FAMILY MEDICINE

## 2023-10-27 PROCEDURE — 90686 IIV4 VACC NO PRSV 0.5 ML IM: CPT | Performed by: FAMILY MEDICINE

## 2023-10-27 PROCEDURE — 99396 PREV VISIT EST AGE 40-64: CPT | Mod: 25 | Performed by: FAMILY MEDICINE

## 2023-10-27 ASSESSMENT — ENCOUNTER SYMPTOMS
WEAKNESS: 0
CONSTIPATION: 0
JOINT SWELLING: 0
ARTHRALGIAS: 0
DIARRHEA: 0
PARESTHESIAS: 0
ABDOMINAL PAIN: 0
NERVOUS/ANXIOUS: 0
HEADACHES: 0
EYE PAIN: 0
PALPITATIONS: 0
HEARTBURN: 0
HEMATOCHEZIA: 0
HEMATURIA: 0
FREQUENCY: 0
SORE THROAT: 0
FEVER: 0
CHILLS: 0
MYALGIAS: 0
DIZZINESS: 0
NAUSEA: 0
DYSURIA: 0
COUGH: 0
SHORTNESS OF BREATH: 0

## 2023-10-27 ASSESSMENT — PAIN SCALES - GENERAL: PAINLEVEL: NO PAIN (0)

## 2023-10-27 NOTE — PROGRESS NOTES
"SUBJECTIVE:   CC: Reyes is an 43 year old who presents for preventative health visit.       10/27/2023     1:26 PM   Additional Questions   Roomed by Brielle Zuniga       Healthy Habits:     Getting at least 3 servings of Calcium per day:  Yes    Bi-annual eye exam:  Yes    Dental care twice a year:  Yes    Sleep apnea or symptoms of sleep apnea:  None    Diet:  Regular (no restrictions)    Frequency of exercise:  4-5 days/week    Duration of exercise:  45-60 minutes    Taking medications regularly:  Yes    Medication side effects:  None    Additional concerns today:  No    Small cysts in right elbow  Been there for many years             Have you ever done Advance Care Planning? (For example, a Health Directive, POLST, or a discussion with a medical provider or your loved ones about your wishes): No, advance care planning information given to patient to review.  Patient plans to discuss their wishes with loved ones or provider.      Social History     Tobacco Use    Smoking status: Never    Smokeless tobacco: Never   Substance Use Topics    Alcohol use: Yes     Comment: rare             10/24/2023    10:12 AM   Alcohol Use   Prescreen: >3 drinks/day or >7 drinks/week? No          No data to display                Last PSA: No results found for: \"PSA\"    Reviewed orders with patient. Reviewed health maintenance and updated orders accordingly - Yes    Reviewed and updated as needed this visit by clinical staff   Tobacco  Allergies  Meds   Med Hx  Surg Hx  Fam Hx  Soc Hx        Reviewed and updated as needed this visit by Provider   Tobacco     Med Hx  Surg Hx  Fam Hx  Soc Hx         Review of Systems   Constitutional:  Negative for chills and fever.   HENT:  Negative for congestion, ear pain, hearing loss and sore throat.    Eyes:  Negative for pain and visual disturbance.   Respiratory:  Negative for cough and shortness of breath.    Cardiovascular:  Negative for chest pain, palpitations and peripheral " "edema.   Gastrointestinal:  Negative for abdominal pain, constipation, diarrhea, heartburn, hematochezia and nausea.   Genitourinary:  Negative for dysuria, frequency, genital sores, hematuria, impotence, penile discharge and urgency.   Musculoskeletal:  Negative for arthralgias, joint swelling and myalgias.   Skin:  Negative for rash.   Neurological:  Negative for dizziness, weakness, headaches and paresthesias.   Psychiatric/Behavioral:  Negative for mood changes. The patient is not nervous/anxious.        OBJECTIVE:   /75   Pulse 73   Temp 98.9  F (37.2  C) (Temporal)   Resp (!) 8   Ht 1.893 m (6' 2.53\")   Wt 111.4 kg (245 lb 11.2 oz)   SpO2 98%   BMI 31.10 kg/m      Physical Exam  Constitutional:       General: He is not in acute distress.     Appearance: Normal appearance.   HENT:      Head: Normocephalic.      Right Ear: Tympanic membrane, ear canal and external ear normal.      Left Ear: Tympanic membrane, ear canal and external ear normal.      Mouth/Throat:      Mouth: Mucous membranes are moist.      Pharynx: No oropharyngeal exudate or posterior oropharyngeal erythema.   Eyes:      General: No scleral icterus.  Cardiovascular:      Rate and Rhythm: Normal rate and regular rhythm.      Heart sounds: No murmur heard.  Pulmonary:      Effort: Pulmonary effort is normal. No respiratory distress.      Breath sounds: Normal breath sounds.   Abdominal:      General: Abdomen is flat. Bowel sounds are normal. There is no distension.      Palpations: Abdomen is soft. There is no mass.      Hernia: No hernia is present.   Lymphadenopathy:      Cervical: No cervical adenopathy.   Neurological:      General: No focal deficit present.      Mental Status: He is alert.   Psychiatric:         Mood and Affect: Mood normal.         Behavior: Behavior normal.           ASSESSMENT/PLAN:       ICD-10-CM    1. Screening for diabetes mellitus  Z13.1 Hemoglobin A1c      2. Screening cholesterol level  Z13.220 Lipid " "panel reflex to direct LDL Fasting      3. Routine general medical examination at a health care facility  Z00.00 REVIEW OF HEALTH MAINTENANCE PROTOCOL ORDERS          COUNSELING:   Reviewed preventive health counseling, as reflected in patient instructions      BMI:   Estimated body mass index is 31.1 kg/m  as calculated from the following:    Height as of this encounter: 1.893 m (6' 2.53\").    Weight as of this encounter: 111.4 kg (245 lb 11.2 oz).   Weight management plan: Discussed healthy diet and exercise guidelines      He reports that he has never smoked. He has never used smokeless tobacco.        Higinio Castellano DO  [facility]  "

## 2023-10-30 ENCOUNTER — LAB (OUTPATIENT)
Dept: LAB | Facility: CLINIC | Age: 43
End: 2023-10-30
Payer: COMMERCIAL

## 2023-10-30 DIAGNOSIS — Z13.1 SCREENING FOR DIABETES MELLITUS: ICD-10-CM

## 2023-10-30 DIAGNOSIS — K21.9 GASTROESOPHAGEAL REFLUX DISEASE, UNSPECIFIED WHETHER ESOPHAGITIS PRESENT: ICD-10-CM

## 2023-10-30 DIAGNOSIS — Z13.220 SCREENING CHOLESTEROL LEVEL: ICD-10-CM

## 2023-10-30 LAB
ALBUMIN SERPL BCG-MCNC: 4.5 G/DL (ref 3.5–5.2)
ALP SERPL-CCNC: 79 U/L (ref 40–129)
ALT SERPL W P-5'-P-CCNC: 34 U/L (ref 0–70)
ANION GAP SERPL CALCULATED.3IONS-SCNC: 11 MMOL/L (ref 7–15)
AST SERPL W P-5'-P-CCNC: 26 U/L (ref 0–45)
BILIRUB SERPL-MCNC: 0.5 MG/DL
BUN SERPL-MCNC: 15.7 MG/DL (ref 6–20)
CALCIUM SERPL-MCNC: 9.5 MG/DL (ref 8.6–10)
CHLORIDE SERPL-SCNC: 102 MMOL/L (ref 98–107)
CHOLEST SERPL-MCNC: 187 MG/DL
CREAT SERPL-MCNC: 1.09 MG/DL (ref 0.67–1.17)
DEPRECATED HCO3 PLAS-SCNC: 27 MMOL/L (ref 22–29)
EGFRCR SERPLBLD CKD-EPI 2021: 87 ML/MIN/1.73M2
ERYTHROCYTE [DISTWIDTH] IN BLOOD BY AUTOMATED COUNT: 11.7 % (ref 10–15)
FERRITIN SERPL-MCNC: 52 NG/ML (ref 31–409)
GLUCOSE SERPL-MCNC: 95 MG/DL (ref 70–99)
HBA1C MFR BLD: 5.2 % (ref 0–5.6)
HCT VFR BLD AUTO: 45.4 % (ref 40–53)
HDLC SERPL-MCNC: 42 MG/DL
HGB BLD-MCNC: 15.2 G/DL (ref 13.3–17.7)
IRON BINDING CAPACITY (ROCHE): 286 UG/DL (ref 240–430)
IRON SATN MFR SERPL: 17 % (ref 15–46)
IRON SERPL-MCNC: 48 UG/DL (ref 61–157)
LDLC SERPL CALC-MCNC: 126 MG/DL
MAGNESIUM SERPL-MCNC: 2.2 MG/DL (ref 1.7–2.3)
MCH RBC QN AUTO: 29.1 PG (ref 26.5–33)
MCHC RBC AUTO-ENTMCNC: 33.5 G/DL (ref 31.5–36.5)
MCV RBC AUTO: 87 FL (ref 78–100)
NONHDLC SERPL-MCNC: 145 MG/DL
PLATELET # BLD AUTO: 130 10E3/UL (ref 150–450)
POTASSIUM SERPL-SCNC: 4.2 MMOL/L (ref 3.4–5.3)
PROT SERPL-MCNC: 7.6 G/DL (ref 6.4–8.3)
RBC # BLD AUTO: 5.23 10E6/UL (ref 4.4–5.9)
SODIUM SERPL-SCNC: 140 MMOL/L (ref 135–145)
TRIGL SERPL-MCNC: 97 MG/DL
VIT B12 SERPL-MCNC: 482 PG/ML (ref 232–1245)
VIT D+METAB SERPL-MCNC: 27 NG/ML (ref 20–50)
WBC # BLD AUTO: 4.6 10E3/UL (ref 4–11)

## 2023-10-30 PROCEDURE — 83735 ASSAY OF MAGNESIUM: CPT

## 2023-10-30 PROCEDURE — 36415 COLL VENOUS BLD VENIPUNCTURE: CPT

## 2023-10-30 PROCEDURE — 80053 COMPREHEN METABOLIC PANEL: CPT

## 2023-10-30 PROCEDURE — 85027 COMPLETE CBC AUTOMATED: CPT

## 2023-10-30 PROCEDURE — 83036 HEMOGLOBIN GLYCOSYLATED A1C: CPT

## 2023-10-30 PROCEDURE — 80061 LIPID PANEL: CPT

## 2023-10-30 PROCEDURE — 82306 VITAMIN D 25 HYDROXY: CPT

## 2023-10-30 PROCEDURE — 82728 ASSAY OF FERRITIN: CPT

## 2023-10-30 PROCEDURE — 83550 IRON BINDING TEST: CPT

## 2023-10-30 PROCEDURE — 82607 VITAMIN B-12: CPT

## 2023-10-30 PROCEDURE — 83540 ASSAY OF IRON: CPT

## 2024-02-13 ENCOUNTER — LAB (OUTPATIENT)
Dept: LAB | Facility: CLINIC | Age: 44
End: 2024-02-13
Payer: COMMERCIAL

## 2024-02-13 DIAGNOSIS — D69.6 THROMBOCYTOPENIA (H): ICD-10-CM

## 2024-02-13 DIAGNOSIS — E61.1 IRON DEFICIENCY: ICD-10-CM

## 2024-02-13 LAB
BASOPHILS # BLD AUTO: 0.1 10E3/UL (ref 0–0.2)
BASOPHILS NFR BLD AUTO: 1 %
EOSINOPHIL # BLD AUTO: 0.2 10E3/UL (ref 0–0.7)
EOSINOPHIL NFR BLD AUTO: 3 %
ERYTHROCYTE [DISTWIDTH] IN BLOOD BY AUTOMATED COUNT: 12 % (ref 10–15)
FERRITIN SERPL-MCNC: 35 NG/ML (ref 31–409)
HCT VFR BLD AUTO: 44.8 % (ref 40–53)
HGB BLD-MCNC: 15 G/DL (ref 13.3–17.7)
IMM GRANULOCYTES # BLD: 0 10E3/UL
IMM GRANULOCYTES NFR BLD: 0 %
IRON BINDING CAPACITY (ROCHE): 284 UG/DL (ref 240–430)
IRON SATN MFR SERPL: 34 % (ref 15–46)
IRON SERPL-MCNC: 97 UG/DL (ref 61–157)
LYMPHOCYTES # BLD AUTO: 2 10E3/UL (ref 0.8–5.3)
LYMPHOCYTES NFR BLD AUTO: 33 %
MCH RBC QN AUTO: 29.3 PG (ref 26.5–33)
MCHC RBC AUTO-ENTMCNC: 33.5 G/DL (ref 31.5–36.5)
MCV RBC AUTO: 88 FL (ref 78–100)
MONOCYTES # BLD AUTO: 0.6 10E3/UL (ref 0–1.3)
MONOCYTES NFR BLD AUTO: 10 %
NEUTROPHILS # BLD AUTO: 3.2 10E3/UL (ref 1.6–8.3)
NEUTROPHILS NFR BLD AUTO: 53 %
PLATELET # BLD AUTO: 148 10E3/UL (ref 150–450)
RBC # BLD AUTO: 5.12 10E6/UL (ref 4.4–5.9)
WBC # BLD AUTO: 6.1 10E3/UL (ref 4–11)

## 2024-02-13 PROCEDURE — 83540 ASSAY OF IRON: CPT

## 2024-02-13 PROCEDURE — 82728 ASSAY OF FERRITIN: CPT

## 2024-02-13 PROCEDURE — 85025 COMPLETE CBC W/AUTO DIFF WBC: CPT

## 2024-02-13 PROCEDURE — 83550 IRON BINDING TEST: CPT

## 2024-02-13 PROCEDURE — 36415 COLL VENOUS BLD VENIPUNCTURE: CPT

## 2024-02-13 NOTE — TELEPHONE ENCOUNTER
"FUTURE VISIT INFORMATION      FUTURE VISIT INFORMATION:  Date: 4/19/24  Time: 1:20pm  Location[place] [facility]  REFERRAL INFORMATION:  Referring provider:  Mundo Chavez PA-C,  Referring providers clinic:  [facility]   Reason for visit/diagnosis  Epistaxis, recurrent [R04.0], ref'd by Mundo Chavez PA-C, rec's in EPIC, pt made appt, [facility] location,     RECORDS REQUESTED FROM:       Clinic name Comments Records Status Imaging Status   [facility] 2/11/24- MYCHART  Mundo Chavez PA-C, EPIC             \"Please notify/message CSS if patient completed outside imaging prior to scheduled appointment and/or any outside records that might have been missed at pre visit -Thank you\"  "

## 2024-02-19 ENCOUNTER — VIRTUAL VISIT (OUTPATIENT)
Dept: GASTROENTEROLOGY | Facility: CLINIC | Age: 44
End: 2024-02-19
Payer: COMMERCIAL

## 2024-02-19 DIAGNOSIS — K21.9 GASTROESOPHAGEAL REFLUX DISEASE, UNSPECIFIED WHETHER ESOPHAGITIS PRESENT: Primary | ICD-10-CM

## 2024-02-19 DIAGNOSIS — D69.6 THROMBOCYTOPENIA (H): ICD-10-CM

## 2024-02-19 DIAGNOSIS — E61.1 IRON DEFICIENCY: ICD-10-CM

## 2024-02-19 PROCEDURE — 99215 OFFICE O/P EST HI 40 MIN: CPT | Mod: 95 | Performed by: PHYSICIAN ASSISTANT

## 2024-02-19 NOTE — PATIENT INSTRUCTIONS
It was a pleasure taking care of you today.  I've included a brief summary of our discussion and care plan from today's visit below.  Please review this information with your primary care provider.  _______________________________________________________________________     My recommendations are summarized as follows:     -- schedule a abdominal ultrasound/sonogram   -- referral to hematology to address low platelet and low iron levels   - Follow up in 3 months, sooner if needed.    ______________________________________________________________________     How do I schedule labs, imaging studies, or procedures that were ordered in clinic today?      Labs: To schedule lab appointment you can contact your local [facility] or call [phone] to schedule at any convenient [facility] location.     Procedures: If a colonoscopy, upper endoscopy, breath test, esophageal manometry, or pH impedence was ordered today, our endoscopy team will call you to schedule this. If you have not heard from our endoscopy team within a week, please call [phone] to schedule.      Imaging Studies: If you were scheduled for a CT scan, X-ray, MRI, ultrasound, HIDA scan or other imaging study, please call [phone] to have this scheduled.      Referral: If a referral to another specialty was ordered, expect a phone call or follow instructions above. If you have not heard from anyone regarding your referral in a week, please call our clinic to check the status.      Who do I call with any questions after my visit?  Please be in touch if there are any further questions that arise following today's visit.  There are multiple ways to contact your gastroenterology care team.       During business hours, you may reach a Gastroenterology nurse at [phone]     To schedule or reschedule an appointment, please call [phone].      You can always send a secure message through [employer].  [employer]bs[employer] messages are answered by  your nurse or doctor typically within 24 hours.  Please allow extra time on weekends and holidays.       For urgent/emergent questions after business hours, you may reach the on-call GI Fellow by contacting the [facility]  at [phone].     How will I get the results of any tests ordered?    You will receive all of your results.  If you have signed up for [employer], any tests ordered at your visit will be available to you after your physician reviews them.  Typically this takes 1-2 weeks.  If there are urgent results that require a change in your care plan, your physician or nurse will call you to discuss the next steps.       What is [employer]?  [employer] is a secure way for you to access all of your healthcare records from the [facility].  It is a web based computer program, so you can sign on to it from any location.  It also allows you to send secure messages to your care team.  I recommend signing up for [employer] access if you have not already done so and are comfortable with using a computer.       How to I schedule a follow-up visit?  If you did not schedule a follow-up visit today, please call [phone] to schedule a follow-up office visit.      Mundo Chavez PA-C  Division of Gastroenterology, Hepatology and Nutrition   [facility]

## 2024-02-19 NOTE — PROGRESS NOTES
GI FOLLOW UP VISIT     HISTORY OF PRESENT ILLNESS:    Reyes Angel is 43 year old male with longstanding history of GERD and hiatal hernia who presents for annual follow up. Overall reflux is well controlled with protonix 40mg daily. We previously attempted to reduce to 20mg with addition of pepcid as needed however this did not control his symptoms and we ultimately decided to have him return to protonix 40mg. He does still use pepcid as needed for additional relief, especially at bedtime. He has tried using a wedge pillow and bed blocks but this makes his neck and back pain worse.  Prior EGD in August 2021 revealed a 3 cm hiatal hernia but was otherwise unremarkable.     Recent labs noted lower ferritin levels with lower iron sat. TIBC was low end of normal. We started him on an iron supplement which did improve his iron sat however his ferritin actually decreased. He also has low platelet count. Has bruising to calves when running. Also has frequent nose bleeds. Has been referred to ENT provider. No known family hx of platelet disorders.     He is not having any melena or bright rectal bleeding. No abd pain. No n/v. No dysphagia.       ALLERGIES: Patient has no known allergies.    Current Outpatient Medications   Medication    pantoprazole (PROTONIX) 40 MG EC tablet     No current facility-administered medications for this visit.        PHYSICAL EXAMINATION:  Constitutional: aaox3, cooperative, pleasant, not dyspneic/diaphoretic, no acute distress  GENERAL: alert and no distress  EYES: Eyes grossly normal to inspection.  No discharge or erythema, or obvious scleral/conjunctival abnormalities.  RESP: No audible wheeze, cough, or visible cyanosis.    SKIN: Visible skin clear. No significant rash, abnormal pigmentation or lesions.  NEURO: Cranial nerves grossly intact.  Mentation and speech appropriate for age.  PSYCH: Appropriate affect, tone, and pace of words      ASSESSMENT/PLAN:    Reyes Angel is a  "43 year old male who presents for follow up.     # GERD  Patient with longstanding history of reflux and hiatal hernia. Overall doing well with protonix 40mg dose. We have previously attempted a lower dose however reflux was uncontrolled. We mutually agreed to keep him on the 40 mg dose. He additionally uses pepcid as needed in the evenings. We reviewed lifestyle changes that could be helpful. He has tried bed blocks and wedge pillows but this causes him to have more trouble with his back. We discussed trying a larger wedge pillow given his height of 6'3\".     # thrombocytopenia  Recent findings of thrombocytopenia. Unclear etiology. He has noted easy bruising to his calves when he runs. More recently also has nose bleeds. Referral to hematology    Will also check RUQ sono to r/o liver disease     # iron deficiency   Recent labs noted lower ferritin levels with lower iron sat. TIBC was low end of normal. We started him on an iron supplement which did improve his iron sat however his ferritin actually decreased. Reviewed differential of iron def. He is not having signs of GI bleeding and not anemic so we will defer endocopisc procedures for now. Possible that PPI is causing decreased iron absorption. Will however check celiac screening labs. Referral to hematology.     Follow up in ~ 3 months, sooner if needed.    Thank you for this consultation.  It was a pleasure to participate in the care of this patient; please contact us with any further questions.        This note was created with voice recognition software, and while reviewed for accuracy, typos may remain.       I spent a total of 40 minutes on the day of the visit.   Time spent by me doing chart review, history and exam, documentation and further activities per the note      Mundo Chavez PA-C  Division of Gastroenterology, Hepatology and Nutrition   [facility]            Video-Visit Details    Video Start Time: " 11:35 AM    Type of service:  Video Visit    Video End Time:12:05 PM    Originating Location (pt. Location): Home    Distant Location (provider location):  On-site    Platform used for Video Visit: Tessa

## 2024-02-19 NOTE — NURSING NOTE
Is the patient currently in the state of [place]? YES    Visit mode:VIDEO    If the visit is dropped, the patient can be reconnected by: VIDEO VISIT: Text to cell phone:   Telephone Information:   Mobile [phone]       Will anyone else be joining the visit? NO  (If patient encounters technical issues they should call [phone])    How would you like to obtain your AVS? MyChart    Are changes needed to the allergy or medication list? No    Reason for visit: No chief complaint on file.    Ab BEF

## 2024-02-23 ENCOUNTER — E-CONSULT (OUTPATIENT)
Dept: HEMATOLOGY | Facility: CLINIC | Age: 44
End: 2024-02-23

## 2024-02-23 ENCOUNTER — HOSPITAL ENCOUNTER (OUTPATIENT)
Dept: ULTRASOUND IMAGING | Facility: CLINIC | Age: 44
Discharge: HOME | End: 2024-02-23
Attending: PHYSICIAN ASSISTANT | Admitting: PHYSICIAN ASSISTANT
Payer: COMMERCIAL

## 2024-02-23 DIAGNOSIS — D69.6 THROMBOCYTOPENIA (H): ICD-10-CM

## 2024-02-23 PROCEDURE — 76705 ECHO EXAM OF ABDOMEN: CPT | Mod: 26 | Performed by: RADIOLOGY

## 2024-02-23 PROCEDURE — 76705 ECHO EXAM OF ABDOMEN: CPT

## 2024-02-23 NOTE — PROGRESS NOTES
2/23/2024     E-Consult has been denied due to: Complexity of question, needs in-person referral.    Interprofessional consultation requested by:  Mundo Chavez PA-C      Clinical Question/Purpose: MY CLINICAL QUESTION IS: Does patient need to be evaluated for a bleeding disorder? He has a mild thrombocytopenia ranging from 130 to 148 over the past few years. He reports calf bruising after running and frequent nosebleeds. Otherwise feeling well. There are no concerns for liver disease: RUQ US of his liver is unremarkable, LFT's wnl. He also has a mild iron deficiency without anemia. Iron level in October 2023 was low at 48 with low iron sat of 17. Ferritin was at 52. After being on iron supplementation for 3 months his iron and iron sat have improved however his ferritin is lower at 32. Labs pending for celiac screening. No signs or symptoms for a GI bleed. Prior Vitamin b12 wnl so suspicion for an autoimmune gastritis is low. Possible that PPI use is leading to affects in iron? Would patient benefit from hematological work up for the thrombocytopenia and the iron def?     Patient assessment and information reviewed: excellent/outstanding E-consult questions!  Based on your workup and evaluation patient would benefit for workup of iron deficiency and for VWD--- namely type 2 VWF which can lead to low platelet counts. This is best done with visit to our [facility]    Recommendations: We will arrange for patient to see provider at [facility].        The recommendations provided in this E-Consult are based on a review of clinical data pertinent to the clinical question presented, without a review of the patient's complete medical record or, the benefit of a comprehensive in-person or virtual patient evaluation. This consultation should not replace the clinical judgement and evaluation of the provider ordering this E-Consult. Any new clinical issues, or changes in patient status since the  filing of this E-Consult will need to be taken into account when assessing these recommendations. Please contact me if you have further questions.    My total time spent reviewing clinical information and formulating assessment was 5 minutes.        Elba Burton MD/PhD

## 2024-03-18 NOTE — PROGRESS NOTES
[facility]  Center for Bleeding and Clotting Disorders  [facility] [address], [address], [place] [address]  Main: [phone], Fax: [phone]    Video Virtual Visit Note:    Patient: Reyes Angel  MRN: [id number]  : 1980  MIKE: 2024  Location of the patient when this video visit is conducted: Patient's home  Location of this writer at the time of this video visit is conducted: [facility], Center for Bleeding and Clotting Disorders.     Due to the ongoing COVID-19 outbreak, this visit was conducted by video, with the patient's approval.    Reason for visit:  Easy bruising. Thrombocytopenia.     HPI:  Reyes is a 44 year old male with a history of GERD (due to hiatal hernia) started on daily PPI with Pantoprazole back in , and history of iron deficiency (never had anemia), who is also found to have mild thrombocytopenia with his platelet count at around 148K, referred by Mundo Chavez PA-C of [facility] for consultation.     Recently, he was seen by his primary care provider back on 2024 and was complaining of easy bruising to his calves when he runs and some epistaxis. Along with his thrombocytopenia, he was referred for a hematology E-Consult. Dr. Elba Burton, staff hematologist did the E-Consult and she recommend workup for iron deficiency and for VWD due to low platelet count and Dr. Burton recommend that he is to be seen here at this clinic.     Reyes reports that he has had epistaxis occasionally as a kid and he recalls that he did have to go home from school once because of epistaxis. However, he never had to undergo cauterization of his nares for his epistaxis until about one month ago when he saw an ENT physician. Since then, he has had no further epistaxis. He reports that he did have a follow up visit with the ENT physician about 2 weeks ago and underwent some further cauterization without any further epistaxis.     In regard  "to his \"bruising\" in his calves. He reports that sometimes after he goes running, he returns and notice some small red spots on his calves but there are not exactly bruises or ecchymosis, they are red lesions. He never had issues with easy bruising or ecchymosis.     He apparently was told in the past that his low platelet count might be related to iron deficiency and thus he has been on iron supplement for the past several months.     He has had wisdom teeth extracted when he was 21 years of age and had no bleeding complications. He never was told that he has excessive bleeding with his every 6 months routine teeth cleaning. He denies any other bleeding issues. No gum bleeding, no hematuria or blood in stools. He otherwise has had no other surgical procedures or previous traumatic injuries.     ROS:  As above.    Medications:   Current Outpatient Medications   Medication    pantoprazole (PROTONIX) 40 MG EC tablet     No current facility-administered medications for this visit.     Allergies:    No Known Allergies     PMH:   Past Medical History:   Diagnosis Date    Gastroesophageal reflux disease        Social History:   Deferred    Family History:  No family history of a bleeding disorder.     Objective:  Visual Examination via Video:  Pleasant in no acute distress.  Normal work of breathing   A+O x 3    Labs:  Component      Latest Ref Rng 11/29/2021  7:57 AM 2/23/2022  8:16 AM 10/30/2023  8:23 AM 2/13/2024  9:10 AM   WBC      4.0 - 11.0 10e3/uL 5.7  5.2  4.6  6.1    RBC Count      4.40 - 5.90 10e6/uL 5.06  5.06  5.23  5.12    Hemoglobin      13.3 - 17.7 g/dL 15.0  15.2  15.2  15.0    Hematocrit      40.0 - 53.0 % 45.0  45.0  45.4  44.8    MCV      78 - 100 fL 89  89  87  88    MCH      26.5 - 33.0 pg 29.6  30.0  29.1  29.3    MCHC      31.5 - 36.5 g/dL 33.3  33.8  33.5  33.5    RDW      10.0 - 15.0 % 12.3  12.9  11.7  12.0    Platelet Count      150 - 450 10e3/uL 146 (L)  160  130 (L)  148 (L)    % Neutrophils   "    % 51  51   53    % Lymphocytes      % 31  32   33    % Monocytes      % 14  13   10    % Eosinophils      % 3  3   3    % Basophils      % 1  1   1    % Immature Granulocytes      %    0    Absolute Neutrophils      1.6 - 8.3 10e3/uL 2.9  2.7   3.2    Absolute Lymphocytes      0.8 - 5.3 10e3/uL 1.8  1.7   2.0    Absolute Monocytes      0.0 - 1.3 10e3/uL 0.8  0.7   0.6    Absolute Eosinophils      0.0 - 0.7 10e3/uL 0.2  0.2   0.2    Absolute Basophils      0.0 - 0.2 10e3/uL 0.0  0.0   0.1    Absolute Immature Granulocytes      <=0.4 10e3/uL    0.0      Platelet count prior to 2021 was done on 1/26/2016 at [facility], which was 200K.     Assessment / Plan:  In summary, Reyes is a 44 year old male with a history of GERD (due to hiatal hernia) started on daily PPI with Pantoprazole back in 2021, and history of iron deficiency (never had anemia), who is also found to have mild thrombocytopenia with his platelet count at around 148K, referred by Mundo Chavez PA-C of [facility] for consultation.    NOTE that he had a normal platelet count back in 1/26/2016 at 200K. Then starting in 2021, his platelet count had been running in the range of 130K - 160K, this is coincide with the timing of him starting on pantoprazole. Although rare, Pantoprazole can induce thrombocytopenia. The degree of his platelet count does not and should not cause any bleeding diathesis as platelet count that is >80K or even at 50K should provide adequate hemostasis.     Reyes has had no issues with bleeding diathesis other than occasional epistaxis for which he never required any cauterization or intervention since he was a child until most recently where he did undergo cauterization x 2 and has had no further episode since even with the first cauterization. Thus I do not feel that his epistaxis has anything to do with his mild thrombocytopenia.     Although Reyes has not had any significant hemostatic challenges in his  "lifetime, he really does not have any bleeding issues with wisdom teeth extractions or any easy bruising. The report of him having bruising in his calves after running is false. From what he described today, these are small red lesion and they are not ecchymosis. Along with the fact that he does not have a family history of bleeding disorders, it is highly unlikely that Reyes has any inherit bleeding disorders.     At this time, I do not recommend further workup about his thrombocytopenia or any bleeding disorders. I have no opposition for him to continue with pantoprazole as long as his platelet count is stable >100K.     In regard to his \"iron deficiency\", he can continue to take oral iron supplement. He never had any issues with anemia (all his hemoglobin were >15 since 2021) and his red cells are normocytic per MCV values.     I explain all of the above to the patient today and answered all his questions to his satisfaction. At this time, I have no further plans to see this patient back on a routine basis.     Thank you for letting us to participate in this patient's care.     Diagnosis:  Mild thrombocytopenia. Likely pantoprazole induced.   Epistaxis. Resolved after cauterization about one month ago.   Hiatal hernia / GERD.     Video-Visit Details:  Type of service:  Video Visit  Video Start Time:  13:57  Video End Time (time video stopped): 14:16  Originating Location (pt. Location): Home  Distant Location (provider location):  [facility] FOR BLEEDING AND CLOTTING DISORDERS   Mode of Communication:  Video Conference via [employer]      Arnel Valverde PA-C, MPAS  Physician Assistant  [facility] for Bleeding and Clotting Disorders.     30 minutes spent by me on the date of the encounter doing chart review, history and exam, documentation and further activities per the note      "

## 2024-03-26 ENCOUNTER — VIRTUAL VISIT (OUTPATIENT)
Dept: HEMATOLOGY | Facility: CLINIC | Age: 44
End: 2024-03-26
Attending: PHYSICIAN ASSISTANT
Payer: COMMERCIAL

## 2024-03-26 DIAGNOSIS — D69.6 THROMBOCYTOPENIA (H): ICD-10-CM

## 2024-03-26 PROCEDURE — 99203 OFFICE O/P NEW LOW 30 MIN: CPT | Mod: VID | Performed by: PHYSICIAN ASSISTANT

## 2024-03-26 NOTE — LETTER
[facility]  Center for Bleeding and Clotting Disorders  [facility] [address], [address], [place] [address]  Main: [phone], Fax: [phone]    Video Virtual Visit Note:    Patient: Reyes Angel  MRN: [id number]  : 1980  MIKE: 2024  Location of the patient when this video visit is conducted: Patient's home  Location of this writer at the time of this video visit is conducted: [facility] Center for Bleeding and Clotting Disorders.     Due to the ongoing COVID-19 outbreak, this visit was conducted by video, with the patient's approval.    Reason for visit:  Easy bruising. Thrombocytopenia.     HPI:  Reyes is a 44 year old male with a history of GERD (due to hiatal hernia) started on daily PPI with Pantoprazole back in , and history of iron deficiency (never had anemia), who is also found to have mild thrombocytopenia with his platelet count at around 148K, referred by Mundo Chavez PA-C of [facility] for consultation.     Recently, he was seen by his primary care provider back on 2024 and was complaining of easy bruising to his calves when he runs and some epistaxis. Along with his thrombocytopenia, he was referred for a hematology E-Consult. Dr. Elba Burton, staff hematologist did the E-Consult and she recommend workup for iron deficiency and for VWD due to low platelet count and Dr. Burton recommend that he is to be seen here at this clinic.     Reyes reports that he has had epistaxis occasionally as a kid and he recalls that he did have to go home from school once because of epistaxis. However, he never had to undergo cauterization of his nares for his epistaxis until about one month ago when he saw an ENT physician. Since then, he has had no further epistaxis. He reports that he did have a follow up visit with the ENT physician about 2 weeks ago and underwent some further cauterization without any further epistaxis.     In  "regard to his \"bruising\" in his calves. He reports that sometimes after he goes running, he returns and notice some small red spots on his calves but there are not exactly bruises or ecchymosis, they are red lesions. He never had issues with easy bruising or ecchymosis.     He apparently was told in the past that his low platelet count might be related to iron deficiency and thus he has been on iron supplement for the past several months.     He has had wisdom teeth extracted when he was 21 years of age and had no bleeding complications. He never was told that he has excessive bleeding with his every 6 months routine teeth cleaning. He denies any other bleeding issues. No gum bleeding, no hematuria or blood in stools. He otherwise has had no other surgical procedures or previous traumatic injuries.     ROS:  As above.    Medications:   Current Outpatient Medications   Medication     pantoprazole (PROTONIX) 40 MG EC tablet     No current facility-administered medications for this visit.     Allergies:    No Known Allergies     PMH:   Past Medical History:   Diagnosis Date     Gastroesophageal reflux disease        Social History:   Deferred    Family History:  No family history of a bleeding disorder.     Objective:  Visual Examination via Video:  Pleasant in no acute distress.  Normal work of breathing   A+O x 3    Labs:  Component      Latest Ref Rng 11/29/2021  7:57 AM 2/23/2022  8:16 AM 10/30/2023  8:23 AM 2/13/2024  9:10 AM   WBC      4.0 - 11.0 10e3/uL 5.7  5.2  4.6  6.1    RBC Count      4.40 - 5.90 10e6/uL 5.06  5.06  5.23  5.12    Hemoglobin      13.3 - 17.7 g/dL 15.0  15.2  15.2  15.0    Hematocrit      40.0 - 53.0 % 45.0  45.0  45.4  44.8    MCV      78 - 100 fL 89  89  87  88    MCH      26.5 - 33.0 pg 29.6  30.0  29.1  29.3    MCHC      31.5 - 36.5 g/dL 33.3  33.8  33.5  33.5    RDW      10.0 - 15.0 % 12.3  12.9  11.7  12.0    Platelet Count      150 - 450 10e3/uL 146 (L)  160  130 (L)  148 (L)    % " Neutrophils      % 51  51   53    % Lymphocytes      % 31  32   33    % Monocytes      % 14  13   10    % Eosinophils      % 3  3   3    % Basophils      % 1  1   1    % Immature Granulocytes      %    0    Absolute Neutrophils      1.6 - 8.3 10e3/uL 2.9  2.7   3.2    Absolute Lymphocytes      0.8 - 5.3 10e3/uL 1.8  1.7   2.0    Absolute Monocytes      0.0 - 1.3 10e3/uL 0.8  0.7   0.6    Absolute Eosinophils      0.0 - 0.7 10e3/uL 0.2  0.2   0.2    Absolute Basophils      0.0 - 0.2 10e3/uL 0.0  0.0   0.1    Absolute Immature Granulocytes      <=0.4 10e3/uL    0.0      Platelet count prior to 2021 was done on 1/26/2016 at [facility], which was 200K.     Assessment / Plan:  In summary, Reyes is a 44 year old male with a history of GERD (due to hiatal hernia) started on daily PPI with Pantoprazole back in 2021, and history of iron deficiency (never had anemia), who is also found to have mild thrombocytopenia with his platelet count at around 148K, referred by Mundo Chavez PA-C of [facility] for consultation.    NOTE that he had a normal platelet count back in 1/26/2016 at 200K. Then starting in 2021, his platelet count had been running in the range of 130K - 160K, this is coincide with the timing of him starting on pantoprazole. Although rare, Pantoprazole can induce thrombocytopenia. The degree of his platelet count does not and should not cause any bleeding diathesis as platelet count that is >80K or even at 50K should provide adequate hemostasis.     Reyes has had no issues with bleeding diathesis other than occasional epistaxis for which he never required any cauterization or intervention since he was a child until most recently where he did undergo cauterization x 2 and has had no further episode since even with the first cauterization. Thus I do not feel that his epistaxis has anything to do with his mild thrombocytopenia.     Although Reyes has not had any significant hemostatic  "challenges in his lifetime, he really does not have any bleeding issues with wisdom teeth extractions or any easy bruising. The report of him having bruising in his calves after running is false. From what he described today, these are small red lesion and they are not ecchymosis. Along with the fact that he does not have a family history of bleeding disorders, it is highly unlikely that Reyes has any inherit bleeding disorders.     At this time, I do not recommend further workup about his thrombocytopenia or any bleeding disorders. I have no opposition for him to continue with pantoprazole as long as his platelet count is stable >100K.     In regard to his \"iron deficiency\", he can continue to take oral iron supplement. He never had any issues with anemia (all his hemoglobin were >15 since 2021) and his red cells are normocytic per MCV values.     I explain all of the above to the patient today and answered all his questions to his satisfaction. At this time, I have no further plans to see this patient back on a routine basis.     Thank you for letting us to participate in this patient's care.     Diagnosis:  Mild thrombocytopenia. Likely pantoprazole induced.   Epistaxis. Resolved after cauterization about one month ago.   Hiatal hernia / GERD.     Video-Visit Details:  Type of service:  Video Visit  Video Start Time:  13:57  Video End Time (time video stopped): 14:16  Originating Location (pt. Location): Home  Distant Location (provider location):  [facility] FOR BLEEDING AND CLOTTING DISORDERS   Mode of Communication:  Video Conference via [employer]      Arnel Valverde PA-C, MPAS  Physician Assistant  [facility] for Bleeding and Clotting Disorders.     30 minutes spent by me on the date of the encounter doing chart review, history and exam, documentation and further activities per the note        Patient was contacted to complete the pre-visit call prior to their " telephone visit with the provider.     Allergies and medications were reviewed.     I thanked them for their time to cover this information.     Nataly George MA

## 2024-03-26 NOTE — PROGRESS NOTES
Patient was contacted to complete the pre-visit call prior to their telephone visit with the provider.     Allergies and medications were reviewed.     I thanked them for their time to cover this information.     Nataly George MA

## 2024-03-26 NOTE — PATIENT INSTRUCTIONS
Reyes,    It was nice to see you via video visit today.    Below is a summary of our plan:  As discussed during your visit, I do belief that your mildly low platelet count (also known as thrombocytopenia) is likely caused by your use of pantoprazole (protonix). It is highly unlikely that your nose bleed has any correlation in regard to your mildly low platelet count as the degree of your low platelet should not cause any bleeding issues.   Other than nose bleeds, you have not experienced any other bleeding issues and along with the fact that you did not have any bleeding complications with wisdom teeth extraction and that you do not have a family history of bleeding disorders, it is highly unlikely that you have any bleeding disorders. Thus I do not feel that we need to further work you up for a bleeding disorder.   At this time, I have no further plans to see you back on a routine basis.   If you should have any further questions or concerns, please call our clinic at [phone] and ask to speak to a nursing staff.     Thank you once again in choosing our clinic as part of your healthcare team.      Arnel Valverde PA-C, MPAS  Physician Assistant  [facility] for Bleeding and Clotting Disorders.

## 2024-04-19 ENCOUNTER — PRE VISIT (OUTPATIENT)
Dept: OTOLARYNGOLOGY | Facility: CLINIC | Age: 44
End: 2024-04-19

## 2024-05-20 ENCOUNTER — VIRTUAL VISIT (OUTPATIENT)
Dept: GASTROENTEROLOGY | Facility: CLINIC | Age: 44
End: 2024-05-20
Attending: PHYSICIAN ASSISTANT
Payer: COMMERCIAL

## 2024-05-20 DIAGNOSIS — Z79.899 LONG-TERM CURRENT USE OF PROTON PUMP INHIBITOR THERAPY: ICD-10-CM

## 2024-05-20 DIAGNOSIS — K21.9 GASTROESOPHAGEAL REFLUX DISEASE, UNSPECIFIED WHETHER ESOPHAGITIS PRESENT: Primary | ICD-10-CM

## 2024-05-20 PROCEDURE — 99214 OFFICE O/P EST MOD 30 MIN: CPT | Mod: 95 | Performed by: PHYSICIAN ASSISTANT

## 2024-05-20 RX ORDER — PANTOPRAZOLE SODIUM 40 MG/1
40 TABLET, DELAYED RELEASE ORAL DAILY
Qty: 90 TABLET | Refills: 3 | Status: SHIPPED | OUTPATIENT
Start: 2024-05-20

## 2024-05-20 NOTE — PROGRESS NOTES
GASTROENTEROLOGY Follow-up VIDEO VISIT    CC/REFERRING MD:    Higinio Castellano    REASON FOR CONSULTATION:   Mundo Chavez for   Chief Complaint   Patient presents with    RECHECK       HISTORY OF PRESENT ILLNESS:    Reyes Angel is a 44 year old male who is being evaluated via a billable video visit for follow-up.  To review, this patient was previously seen by my partner, Mundo Chavez PA-C, for longstanding symptoms of GERD.  He ultimately underwent EGD about 3 years ago that was notable for 3 cm hiatal hernia.  He was placed on Protonix and had good symptom improvement.  Earlier this year, it was noted that he was possibly iron deficient and had some mildly low platelet counts.  This was in the context of some recurrent epistaxis.  He ultimately went and saw ENT and had nasal cauterization, which has resolved the nosebleeds.  He did see hematology and they were not concerned at all by any hematologic disease process.  He remains on 40 mg pantoprazole daily.  He rarely uses Pepcid.  He tries to avoid eating late at night and elevates the head of his bed, also sleeps on his left side.  He describes having pretty good symptom control.  Previous attempts at lowering dose of pantoprazole were ineffective.      I have reviewed and updated the patient's Past Medical History, Social History, Family History and Medication List.    Exam:    General appearance:  Healthy appearing adult, in no acute distress  Eyes:  Sclera anicteric, Pupils round and reactive to light  Ears, nose, mouth and throat:  No obvious external lesions of ears and nose.  Hearing intact  Neck:  Symmetric, No obvious external lesions  Respiratory:  Normal respiration, no use of accessory muscles   MSK:  No visual upper extremity, neck or facial muscle atrophy  ABD:  No visual abdominal distention, no audible borborygmi  Skin:  No rashes or jaundice   Psychiatric:  Oriented to person, place and time, Appropriate mood and affect.    Neurologic:  Peripheral muscle function and dexterity appear to be intact      PERTINENT STUDIES have been reviewed.    ASSESSMENT/PLAN:    Reyes Angel is a 44 year old male who presents for follow up of GERD with known hiatal hernia.  He has good symptom control on pantoprazole 40 mg daily and Pepcid as needed.  We did review hematology consult, no concerns about mildly low platelets and fortunately, recurrent epistaxis has resolved with nasal cauterization.  For now, we will plan on continuing pantoprazole daily.  We can reassess his nutritional markers at some point this year, future orders entered.  Okay to follow-up with me in 1 year, sooner as needed.    1. Gastroesophageal reflux disease, unspecified whether esophagitis present  - pantoprazole (PROTONIX) 40 MG EC tablet; Take 1 tablet (40 mg) by mouth daily Take 30 minutes before eating.  Dispense: 90 tablet; Refill: 3    2. Long-term current use of proton pump inhibitor therapy  - CBC with platelets and differential; Future  - Comprehensive metabolic panel (BMP + Alb, Alk Phos, ALT, AST, Total. Bili, TP); Future  - Vitamin B12; Future  - Folate RBC; Future  - Hematocrit; Future  - Vitamin D Deficiency; Future  - Magnesium; Future  - Iron and iron binding capacity; Future  - Ferritin; Future  - pantoprazole (PROTONIX) 40 MG EC tablet; Take 1 tablet (40 mg) by mouth daily Take 30 minutes before eating.  Dispense: 90 tablet; Refill: 3      Video-Visit Details    Video Visit Time: 13 minutes    Type of service:  Video Visit    Originating Location (pt. Location): Home    Distant Location (provider location):  Off-site    Platform used for Video Visit: [employer]    A total of 20 minutes was spent with reviewing the chart, discussing with the patient, documentation and coordination of care.    Sergey Hamm PA-C  Division of Gastroenterology, Hepatology, and Nutrition  [facility]  [phone]    RT 1 year

## 2024-05-20 NOTE — NURSING NOTE
Is the patient currently in the state of [place]? YES    Visit mode:VIDEO    If the visit is dropped, the patient can be reconnected by: VIDEO VISIT: Send to e-mail at: [email or web address]    Will anyone else be joining the visit? NO  (If patient encounters technical issues they should call [phone])    How would you like to obtain your AVS? MyChart    Are changes needed to the allergy or medication list? No    Are refills needed on medications prescribed by this physician? YES    Reason for visit: RECHECK    Ab KAUR

## 2024-11-30 ENCOUNTER — HEALTH MAINTENANCE LETTER (OUTPATIENT)
Age: 44
End: 2024-11-30

## 2025-01-15 ENCOUNTER — MYC MEDICAL ADVICE (OUTPATIENT)
Dept: GASTROENTEROLOGY | Facility: CLINIC | Age: 45
End: 2025-01-15
Payer: COMMERCIAL

## 2025-01-15 DIAGNOSIS — K44.9 HIATAL HERNIA: ICD-10-CM

## 2025-01-15 DIAGNOSIS — K21.9 GASTROESOPHAGEAL REFLUX DISEASE, UNSPECIFIED WHETHER ESOPHAGITIS PRESENT: Primary | ICD-10-CM

## 2025-05-19 ENCOUNTER — VIRTUAL VISIT (OUTPATIENT)
Dept: GASTROENTEROLOGY | Facility: CLINIC | Age: 45
End: 2025-05-19
Attending: PHYSICIAN ASSISTANT
Payer: COMMERCIAL

## 2025-05-19 VITALS — WEIGHT: 245 LBS | HEIGHT: 75 IN | BODY MASS INDEX: 30.46 KG/M2

## 2025-05-19 DIAGNOSIS — Z79.899 LONG-TERM CURRENT USE OF PROTON PUMP INHIBITOR THERAPY: ICD-10-CM

## 2025-05-19 DIAGNOSIS — K21.9 GASTROESOPHAGEAL REFLUX DISEASE, UNSPECIFIED WHETHER ESOPHAGITIS PRESENT: ICD-10-CM

## 2025-05-19 PROCEDURE — 98006 SYNCH AUDIO-VIDEO EST MOD 30: CPT | Performed by: PHYSICIAN ASSISTANT

## 2025-05-19 RX ORDER — PANTOPRAZOLE SODIUM 40 MG/1
40 TABLET, DELAYED RELEASE ORAL DAILY
Qty: 90 TABLET | Refills: 3 | Status: SHIPPED | OUTPATIENT
Start: 2025-05-19

## 2025-05-19 ASSESSMENT — PAIN SCALES - GENERAL: PAINLEVEL_OUTOF10: NO PAIN (0)

## 2025-05-19 NOTE — NURSING NOTE
Current patient location: Patient declined to provide     Is the patient currently in the state of [place]? YES    Visit mode: VIDEO    If the visit is dropped, the patient can be reconnected by:VIDEO VISIT: Text to cell phone:   Telephone Information:   Mobile [phone]       Will anyone else be joining the visit? NO  (If patient encounters technical issues they should call [phone]    Are changes needed to the allergy or medication list? No    Are refills needed on medications prescribed by this physician? NO    Rooming Documentation:  Questionnaire(s) completed    Reason for visit: RECHECK (Recheck)    Bonnie KAUR

## 2025-05-19 NOTE — PROGRESS NOTES
GASTROENTEROLOGY Follow-up VIDEO VISIT    CC/REFERRING MD:    Higinio Castellano    REASON FOR CONSULTATION:   Sergey Hamm for   Chief Complaint   Patient presents with    RECHECK     Recheck       HISTORY OF PRESENT ILLNESS:    Reyes Angel is a 45 year old male who is being evaluated via a billable video visit for follow-up.  To review, I most recently met with patient about a year ago.  We have been following him for a few years for nonerosive GERD in the setting of 3 cm hiatal hernia on EGD in 2021.  He has been on Protonix since then with excellent symptom improvement.  He had attempted to titrate off medication but unfortunately had recurrence of symptoms.  There had been some concern about iron deficiency and mild thrombocytopenia, saw hematology last year and there were no concerns of any hematologic disease.  It was thought that his PPI might be contributing.  He had repeat labs in February of this year and his hemoglobin and platelets have normalized.  He messaged me in January noting some pressure in the epigastrium/left upper quadrant, feeling like part of his rib was pushing or poking on his upper abdomen.  We did an esophagram to reassess his known hiatal hernia and this showed continued small hiatal hernia and some spontaneous reflux, then had further workup with PCP to include rib x-ray and ultimately a an abdominal ultrasound, which revealed cholelithiasis.  He met with general surgery and they felt that his gallstones were unlikely to be causing his symptoms.  He notes that this sensation has largely gotten better, not really causing any pain, just mildly bothersome.  Otherwise, he is happy with how pantoprazole is controlling reflux symptoms.      I have reviewed and updated the patient's Past Medical History, Social History, Family History and Medication List.    Exam:    General appearance:  Healthy appearing adult, in no acute distress  Eyes:  Sclera anicteric, Pupils round  and reactive to light  Ears, nose, mouth and throat:  No obvious external lesions of ears and nose.  Hearing intact  Neck:  Symmetric, No obvious external lesions  Respiratory:  Normal respiration, no use of accessory muscles   MSK:  No visual upper extremity, neck or facial muscle atrophy  ABD:  No visual abdominal distention, no audible borborygmi  Skin:  No rashes or jaundice   Psychiatric:  Oriented to person, place and time, Appropriate mood and affect.   Neurologic:  Peripheral muscle function and dexterity appear to be intact      PERTINENT STUDIES have been reviewed.    ASSESSMENT/PLAN:    Reyes Angel is a 45 year old male who presents for follow up of nonerosive GERD in the setting of small hiatal hernia.  Has good control of typical GERD symptoms with daily PPI.  He has been unable to taper off without recurrence of symptoms and so long-term therapy is certainly warranted here.  He has had a little bit of pressure and discomfort in the epigastric region, normal chest and rib x-rays, esophagram with stable small hiatal hernia, and abdominal ultrasound with no liver, pancreatic findings, and what appear to be asymptomatic gallstones.  Reviewed that this sensation could be related to known hiatal hernia, but if not overly bothersome, I would recommend against further investigation and just monitoring for now.  Reviewed that he can continue to have annual labs to assess for iron and B12 deficiency.  No indication for further endoscopy unless he has a significant change in symptoms.  Did briefly talk about hiatal hernia repair, it sounds like he talked about this with general surgeon.  Can certainly pursue if desired, would likely need pH impedance and manometry prior to this.  He can follow-up with me annually, sooner as needed.    1. Gastroesophageal reflux disease, unspecified whether esophagitis present  - pantoprazole (PROTONIX) 40 MG EC tablet; Take 1 tablet (40 mg) by mouth daily. Take 30 minutes  before eating.  Dispense: 90 tablet; Refill: 3    2. Long-term current use of proton pump inhibitor therapy  - pantoprazole (PROTONIX) 40 MG EC tablet; Take 1 tablet (40 mg) by mouth daily. Take 30 minutes before eating.  Dispense: 90 tablet; Refill: 3      Video-Visit Details    Video Visit Time: 13 minutes    Type of service:  Video Visit    Originating Location (pt. Location): Home    Distant Location (provider location):  Off-site    Platform used for Video Visit: Tessa    A total of 18 minutes was spent with reviewing the chart, discussing with the patient, documentation and coordination of care on 5/19/2025.    Sergey Hamm PA-C  Division of Gastroenterology, Hepatology, and Nutrition  [facility]  [phone]    RTC 1 year

## 2025-08-25 ENCOUNTER — PATIENT OUTREACH (OUTPATIENT)
Dept: CARE COORDINATION | Facility: CLINIC | Age: 45
End: 2025-08-25
Payer: COMMERCIAL

## (undated) DEVICE — GLOVE EXAM NITRILE LG PF LATEX FREE 5064

## (undated) DEVICE — SOL WATER IRRIG 500ML BOTTLE 2F7113

## (undated) DEVICE — SUCTION CATH AIRLIFE TRI-FLO W/CONTROL PORT 14FR  T60C

## (undated) DEVICE — ENDO BITE BLOCK ADULT OMNI-BLOC

## (undated) DEVICE — TUBING SUCTION 12"X1/4" N612

## (undated) DEVICE — SYR 30ML SLIP TIP W/O NDL 302833

## (undated) DEVICE — KIT ENDO TURNOVER/PROCEDURE CARRY-ON 101822

## (undated) DEVICE — GOWN IMPERVIOUS 2XL BLUE

## (undated) DEVICE — SUCTION MANIFOLD NEPTUNE 2 SYS 1 PORT 702-025-000

## (undated) RX ORDER — ONDANSETRON 2 MG/ML
INJECTION INTRAMUSCULAR; INTRAVENOUS
Status: DISPENSED
Start: 2021-08-31

## (undated) RX ORDER — DIPHENHYDRAMINE HYDROCHLORIDE 50 MG/ML
INJECTION INTRAMUSCULAR; INTRAVENOUS
Status: DISPENSED
Start: 2021-08-31

## (undated) RX ORDER — FENTANYL CITRATE 50 UG/ML
INJECTION, SOLUTION INTRAMUSCULAR; INTRAVENOUS
Status: DISPENSED
Start: 2021-08-31